# Patient Record
Sex: FEMALE | Race: BLACK OR AFRICAN AMERICAN | Employment: FULL TIME | URBAN - METROPOLITAN AREA
[De-identification: names, ages, dates, MRNs, and addresses within clinical notes are randomized per-mention and may not be internally consistent; named-entity substitution may affect disease eponyms.]

---

## 2022-10-13 ENCOUNTER — APPOINTMENT (OUTPATIENT)
Dept: GENERAL RADIOLOGY | Age: 47
End: 2022-10-13
Payer: COMMERCIAL

## 2022-10-13 ENCOUNTER — HOSPITAL ENCOUNTER (EMERGENCY)
Age: 47
Discharge: HOME | End: 2022-10-13
Attending: EMERGENCY MEDICINE
Payer: COMMERCIAL

## 2022-10-13 VITALS
SYSTOLIC BLOOD PRESSURE: 184 MMHG | HEIGHT: 64 IN | BODY MASS INDEX: 39.15 KG/M2 | TEMPERATURE: 98 F | WEIGHT: 229.3 LBS | DIASTOLIC BLOOD PRESSURE: 110 MMHG | OXYGEN SATURATION: 99 % | RESPIRATION RATE: 14 BRPM

## 2022-10-13 DIAGNOSIS — Z23 NEED FOR TETANUS BOOSTER: ICD-10-CM

## 2022-10-13 DIAGNOSIS — S61.411A LACERATION OF RIGHT HAND WITHOUT FOREIGN BODY, INITIAL ENCOUNTER: Primary | ICD-10-CM

## 2022-10-13 DIAGNOSIS — I10 ESSENTIAL HYPERTENSION: ICD-10-CM

## 2022-10-13 DIAGNOSIS — Z76.0 ENCOUNTER FOR MEDICATION REFILL: ICD-10-CM

## 2022-10-13 PROCEDURE — 73130 X-RAY EXAM OF HAND: CPT

## 2022-10-13 PROCEDURE — 90471 IMMUNIZATION ADMIN: CPT | Performed by: EMERGENCY MEDICINE

## 2022-10-13 PROCEDURE — 12002 RPR S/N/AX/GEN/TRNK2.6-7.5CM: CPT

## 2022-10-13 PROCEDURE — 99284 EMERGENCY DEPT VISIT MOD MDM: CPT

## 2022-10-13 PROCEDURE — 90714 TD VACC NO PRESV 7 YRS+ IM: CPT | Performed by: EMERGENCY MEDICINE

## 2022-10-13 PROCEDURE — 6360000002 HC RX W HCPCS: Performed by: EMERGENCY MEDICINE

## 2022-10-13 RX ORDER — HYDROCHLOROTHIAZIDE 12.5 MG/1
12.5 TABLET ORAL DAILY
COMMUNITY
End: 2022-10-13 | Stop reason: SDUPTHER

## 2022-10-13 RX ORDER — AMLODIPINE BESYLATE 10 MG/1
10 TABLET ORAL DAILY
Qty: 30 TABLET | Refills: 0 | Status: SHIPPED | OUTPATIENT
Start: 2022-10-13

## 2022-10-13 RX ORDER — HYDROCHLOROTHIAZIDE 12.5 MG/1
12.5 TABLET ORAL DAILY
Qty: 30 TABLET | Refills: 0 | Status: SHIPPED | OUTPATIENT
Start: 2022-10-13

## 2022-10-13 RX ORDER — AMLODIPINE BESYLATE 10 MG/1
10 TABLET ORAL DAILY
COMMUNITY
End: 2022-10-13 | Stop reason: SDUPTHER

## 2022-10-13 RX ORDER — CEPHALEXIN 500 MG/1
500 CAPSULE ORAL 4 TIMES DAILY
Qty: 40 CAPSULE | Refills: 0 | Status: SHIPPED | OUTPATIENT
Start: 2022-10-13 | End: 2022-10-23

## 2022-10-13 RX ADMIN — CLOSTRIDIUM TETANI TOXOID ANTIGEN (FORMALDEHYDE INACTIVATED) AND CORYNEBACTERIUM DIPHTHERIAE TOXOID ANTIGEN (FORMALDEHYDE INACTIVATED) 0.5 ML: 5; 2 INJECTION, SUSPENSION INTRAMUSCULAR at 16:20

## 2022-10-13 ASSESSMENT — PAIN DESCRIPTION - DESCRIPTORS: DESCRIPTORS: THROBBING

## 2022-10-13 ASSESSMENT — PAIN DESCRIPTION - LOCATION: LOCATION: HAND

## 2022-10-13 ASSESSMENT — PAIN - FUNCTIONAL ASSESSMENT: PAIN_FUNCTIONAL_ASSESSMENT: 0-10

## 2022-10-13 NOTE — LETTER
[facility] Emergency Department  [address], [facility], [address]             October 13, 2022    Patient: Barbara Jacob   YOB: 1975   Date of Visit: 10/13/2022       To Whom It May Concern:    Barbara Jacob was seen and treated in our emergency department on 10/13/2022. She may return to work on 10/16/2022.       Sincerely,   Mercy Canseco RN      ***

## 2022-10-13 NOTE — ED PROVIDER NOTES
Emergency Physician Note        Note Open Time: 3:38 PM EDT    Chief Complaint  Laceration (Pt presents with laceration to right hand. Reports she was washing a glass and it broke. )       History of Present Illness  Ayush Winkler is a 50 y.o. female who presents to the ED for hand laceration. Patient reports that she was washing a cup when it shattered and cut her hand. Tetanus is not up-to-date. Bleeding controlled prior to arrival.  No other injuries reported. Pain is mild to moderate and constant and nonradiating. 10 systems reviewed, pertinent positives per HPI otherwise noted to be negative    I have reviewed the following from the nursing documentation:      Prior to Admission medications    Medication Sig Start Date End Date Taking? Authorizing Provider   amLODIPine (NORVASC) 10 MG tablet Take 10 mg by mouth daily   Yes Historical Provider, MD   hydroCHLOROthiazide (HYDRODIURIL) 12.5 MG tablet Take 12.5 mg by mouth daily   Yes Historical Provider, MD       Allergies as of 10/13/2022    (No Known Allergies)       Past Medical History:   Diagnosis Date    Hypertension         Surgical History:   Past Surgical History:   Procedure Laterality Date    HYSTERECTOMY (CERVIX STATUS UNKNOWN)          Family History:  History reviewed. No pertinent family history.     Social History     Socioeconomic History    Marital status: Single     Spouse name: Not on file    Number of children: Not on file    Years of education: Not on file    Highest education level: Not on file   Occupational History    Not on file   Tobacco Use    Smoking status: Never    Smokeless tobacco: Never   Vaping Use    Vaping Use: Never used   Substance and Sexual Activity    Alcohol use: Yes     Comment: occasional    Drug use: Never    Sexual activity: Not on file   Other Topics Concern    Not on file   Social History Narrative    Not on file     Social Determinants of Health     Financial Resource Strain: Not on file   Food Insecurity: Not on file   Transportation Needs: Not on file   Physical Activity: Not on file   Stress: Not on file   Social Connections: Not on file   Intimate Partner Violence: Not on file   Housing Stability: Not on file       Nursing notes reviewed. ED Triage Vitals [10/13/22 1440]   Enc Vitals Group      BP (!) 184/110      Pulse       Resp 14      Temp 98 °F (36.7 °C)      Temp Source Oral      SpO2 99 %      Weight 229 lb 4.8 oz (104 kg)      Height 5' 4\" (1.626 m)      Head Circumference       Peak Flow       Pain Score       Pain Loc       Pain Edu? Excl. in [address]? GENERAL:  Awake, alert. Well developed, well nourished with no apparent distress. HENT:  Normocephalic, Atraumatic, moist mucous membranes. EXTREMITIES:  Normal range of motion, no edema, no bony tenderness, no deformity, distal pulses present. Neurovascular intact to the fingertips of the right hand with normal tendon function and strength as well. SKIN: Warm, dry and linear laceration oriented transversely to the axis of the extremity that extends from just barely on the palmar aspect of the ulnar edge of the hand and then to primarily extend over the dorsum of the hand overlying the fifth metacarpal.  Total length of laceration is 4 cm. NEUROLOGIC: Normal mental status. Moving all extremities to command. RADIOLOGY  X-RAYS:  I have reviewed radiologic plain film image(s). ALL OTHER NON-PLAIN FILM IMAGES SUCH AS CT, ULTRASOUND AND MRI HAVE BEEN READ BY THE RADIOLOGIST. XR HAND RIGHT (MIN 3 VIEWS)   Preliminary Result   1. No evidence of radiopaque foreign body. 2. No evidence of acute fracture. PROCEDURES  PROCEDURE:  LACERATION REPAIR  Zoya Blair or their surrogate had an opportunity to ask questions, and the risks, benefits, and alternatives were discussed. The wound was prepped and draped to maintain a sterile field. A local anesthetic was used to completely anesthetize the wound. It was copiously irrigated.  It was explored to its depth in a bloodless field with no sign of tendon, nerve, or vascular injury. No foreign bodies were identified. It was closed with 9 interrupted stitches of 5-0 Prolene. Total length of laceration(s) is 4cm. There were no complications during the procedure. MEDICAL DECISION MAKING     Given that the injury occurred in [facility] I do consider this to be a dirty wound and for this reason it was well irrigated and I am placing the patient on antibiotics for prophylaxis of infection. Patient is requesting refill of her antihypertensive medications as well. I advised the patient to return to the emergency department immediately for any new or worsening symptoms, such as any redness or swelling or drainage from the wound. .   The patient voiced agreement and understanding of the treatment plan. No results found for this visit on 10/13/22. I estimate there is LOW risk for CELLULITIS, COMPARTMENT SYNDROME, NECROTIZING FASCIITIS, TENDON OR NEUROVASCULAR INJURY, or FOREIGN BODY, thus I consider the discharge disposition reasonable. Also, there is no evidence or peritonitis, sepsis, or toxicity. Saumya Sherman and I have discussed the diagnosis and risks, and we agree with discharging home to follow-up with their primary doctor. We also discussed returning to the Emergency Department immediately if new or worsening symptoms occur. We have discussed the symptoms which are most concerning (e.g., changing or worsening pain, fever, numbness, weakness, cool or painful digits) that necessitate immediate return. Final Impression    1. Laceration of right hand without foreign body, initial encounter    2. Need for tetanus booster    3. Encounter for medication refill    4. Essential hypertension        Discharge Vital Signs:  Blood pressure (!) 184/110, temperature 98 °F (36.7 °C), temperature source Oral, resp. rate 14, height 5' 4\" (1.626 m), weight 229 lb 4.8 oz (104 kg), SpO2 99 %.       Patient was given scripts for the following medications. I counseled patient how to take these medications. New Prescriptions    CEPHALEXIN (KEFLEX) 500 MG CAPSULE    Take 1 capsule by mouth 4 times daily for 10 days       Disposition  Pt is in good condition upon Discharge to home. This chart was generated using the [employer] dictation system. I created this record but it may contain dictation errors.           Nicko Laureano MD  10/13/22 9613

## 2022-10-13 NOTE — ED TRIAGE NOTES
Pt presents with laceration to right hand. Reports she was washing a glass and it broke. Pt states last tetanus greater than 10 years.

## 2022-10-13 NOTE — LETTER
[facility] Emergency Department  [address] [facility], [address]             October 13, 2022    Patient: Eusebio Victoria   YOB: 1975   Date of Visit: 10/13/2022       To Whom It May Concern:    Eusebio Victoria was seen and treated in our emergency department on 10/13/2022. She  Lydia return to work on 10/16/2022.     Sincerely,   José Miguel Reid RN      ***

## 2022-10-21 ENCOUNTER — HOSPITAL ENCOUNTER (EMERGENCY)
Age: 47
Discharge: HOME | End: 2022-10-21
Payer: COMMERCIAL

## 2022-10-21 VITALS
RESPIRATION RATE: 18 BRPM | HEIGHT: 64 IN | WEIGHT: 225 LBS | OXYGEN SATURATION: 97 % | SYSTOLIC BLOOD PRESSURE: 149 MMHG | TEMPERATURE: 97.1 F | DIASTOLIC BLOOD PRESSURE: 104 MMHG | BODY MASS INDEX: 38.41 KG/M2 | HEART RATE: 90 BPM

## 2022-10-21 DIAGNOSIS — Z48.02 VISIT FOR SUTURE REMOVAL: Primary | ICD-10-CM

## 2022-10-21 PROCEDURE — 99282 EMERGENCY DEPT VISIT SF MDM: CPT

## 2022-10-21 ASSESSMENT — PAIN - FUNCTIONAL ASSESSMENT: PAIN_FUNCTIONAL_ASSESSMENT: NONE - DENIES PAIN

## 2022-10-21 ASSESSMENT — ENCOUNTER SYMPTOMS
CONSTIPATION: 0
NAUSEA: 0
COUGH: 0
SHORTNESS OF BREATH: 0
SORE THROAT: 0
BACK PAIN: 0
ABDOMINAL PAIN: 0
VOMITING: 0
DIARRHEA: 0
EYE PAIN: 0
RHINORRHEA: 0

## 2022-10-21 NOTE — Clinical Note
Porsha Kruger was seen and treated in our emergency department on 10/21/2022. She may return to work on 10/22/2022. If you have any questions or concerns, please don't hesitate to call.       BELINDA Rajan

## 2022-10-21 NOTE — DISCHARGE INSTRUCTIONS
Follow up with your primary care provider in one week for a recheck    Keep steri-strips over wound until they fall off    Wear finger splint to prevent wound from opening back up for about one week while active at work    Return to the ED if you develop worsening pain, wound discharge, fever or chills. Take Tylenol for pain as needed.

## 2022-10-21 NOTE — ED PROVIDER NOTES
[address] [facility]        Pt Name: Enedelia Velasquez  MRN: [id number]  [place] 1975  Date of evaluation: 10/21/2022  Provider: BELINDA Richter  PCP: No primary care provider on file. Note Started: 5:53 PM EDT       TERESA. I have evaluated this patient. My supervising physician was available for consultation. CHIEF COMPLAINT       Chief Complaint   Patient presents with    Suture / Staple Removal     Stitches removed from right hand       HISTORY OF PRESENT ILLNESS   (Location, Timing/Onset, Context/Setting, Quality, Duration, Modifying Factors, Severity, Associated Signs and Symptoms)  Note limiting factors. Chief Complaint: Suture removal right hand    Enedelia Velasquez is a 50 y.o. female who presents the emergency department to have sutures removed from the right hand. Patient states that sutures were placed approximately 8 days ago. Patient has not noticed any fevers, chills, wound discharge. Patient has some mild tenderness over the suture site but otherwise in no acute pain. Patient has full range of motion of the fingers on the right hand. There is a sutured laceration to the dorsal aspect fifth digit side of the right hand. There are 9 sutures in place. Nursing Notes were all reviewed and agreed with or any disagreements were addressed in the HPI. REVIEW OF SYSTEMS    (2-9 systems for level 4, 10 or more for level 5)     Review of Systems   Constitutional:  Negative for chills, diaphoresis and fever. HENT:  Negative for congestion, rhinorrhea and sore throat. Eyes:  Negative for pain and visual disturbance. Respiratory:  Negative for cough and shortness of breath. Cardiovascular:  Negative for chest pain and leg swelling. Gastrointestinal:  Negative for abdominal pain, constipation, diarrhea, nausea and vomiting. Genitourinary:  Negative for difficulty urinating, dysuria and frequency.    Musculoskeletal: Negative for back pain and neck pain. Skin:  Negative for rash and wound. Neurological:  Negative for dizziness and light-headedness. Positives and Pertinent negatives as per HPI. Except as noted above in the ROS, all other systems were reviewed and negative. PAST MEDICAL HISTORY     Past Medical History:   Diagnosis Date    Hypertension          SURGICAL HISTORY     Past Surgical History:   Procedure Laterality Date    HYSTERECTOMY (CERVIX STATUS UNKNOWN)           CURRENTMEDICATIONS       Previous Medications    AMLODIPINE (NORVASC) 10 MG TABLET    Take 1 tablet by mouth daily    CEPHALEXIN (KEFLEX) 500 MG CAPSULE    Take 1 capsule by mouth 4 times daily for 10 days    HYDROCHLOROTHIAZIDE (HYDRODIURIL) 12.5 MG TABLET    Take 1 tablet by mouth daily         ALLERGIES     Patient has no known allergies. FAMILYHISTORY     No family history on file. SOCIAL HISTORY       Social History     Tobacco Use    Smoking status: Never    Smokeless tobacco: Never   Vaping Use    Vaping Use: Never used   Substance Use Topics    Alcohol use: Yes     Comment: occasional    Drug use: Never       SCREENINGS    Asha Coma Scale  Eye Opening: Spontaneous  Best Verbal Response: Oriented  Best Motor Response: Obeys commands  [place] Coma Scale Score: 15        PHYSICAL EXAM    (up to 7 for level 4, 8 or more for level 5)     ED Triage Vitals [10/21/22 1714]   BP Temp Temp Source Heart Rate Resp SpO2 Height Weight   (!) 149/104 97.1 °F (36.2 °C) Temporal 90 18 97 % 5' 4\" (1.626 m) 225 lb (102.1 kg)       Physical Exam  Vitals and nursing note reviewed. Constitutional:       Appearance: Normal appearance. Cardiovascular:      Comments: Right radial pulse 2+. Musculoskeletal:      Right hand: Laceration present. No swelling, deformity, tenderness or bony tenderness. Normal range of motion. Normal strength. Normal sensation. Normal pulse.       Left hand: Normal.   Skin:     Comments: Right hand dorsal aspect with 9 sutures in place over the fifth metacarpal area. There is no signs of infection, redness, discharge. Of the fifth digit. Neurological:      Mental Status: She is alert. DIAGNOSTIC RESULTS   LABS:    Labs Reviewed - No data to display    When ordered only abnormal lab results are displayed. All other labs were within normal range or not returned as of this dictation. EKG: When ordered, EKG's are interpreted by the Emergency Department Physician in the absence of a cardiologist.  Please see their note for interpretation of EKG. RADIOLOGY:   Non-plain film images such as CT, Ultrasound and MRI are read by the radiologist. Plain radiographic images are visualized and preliminarily interpreted by the ED Provider with the below findings:        Interpretation per the Radiologist below, if available at the time of this note:    No orders to display     No results found. PROCEDURES   Unless otherwise noted below, none     Suture Removal    Date/Time: 10/21/2022 5:56 PM  Performed by: Adelene Riedel, PA  Authorized by: Adelene Riedel, PA     Consent:     Consent obtained:  Verbal    Consent given by:  Patient  Procedure details:     Wound appearance:  No signs of infection and clean    Number of sutures removed:  9  Post-procedure details:     Post-removal:  Steri-Strips applied    Procedure completion:  Tolerated  Comments:      Due to higher risk for dehiscence Steri-Strips were placed over the wound as well as a finger splint applied to the fifth digit to prevent further flexion and extension of this finger that could cause wound dehiscence.     CRITICAL CARE TIME       CONSULTS:  None      EMERGENCY DEPARTMENT COURSE and DIFFERENTIAL DIAGNOSIS/MDM:   Vitals:    Vitals:    10/21/22 1714   BP: (!) 149/104   Pulse: 90   Resp: 18   Temp: 97.1 °F (36.2 °C)   TempSrc: Temporal   SpO2: 97%   Weight: 225 lb (102.1 kg)   Height: 5' 4\" (1.626 m)       Patient was given the following medications:  Medications - No data to display      Is this patient to be included in the SEP-1 Core Measure due to severe sepsis or septic shock? No   Exclusion criteria - the patient is NOT to be included for SEP-1 Core Measure due to: Infection is not suspected    47-year-old female presents emergency department to have sutures removed. Patient states that the sutures were placed approximately 8 days ago. Patient has not noticed any redness swelling or discharge. On exam patient otherwise appears well has full range of motion of the hand and fingers. There is no signs of infection. 9 sutures are in place and these were removed today. There was concern for possible dehiscence given that there is a a lot of movement in this area and Steri-Strips were applied as well as a finger splint to wear during the day when active at work to prevent her from fully flexing extending the finger to prevent wound dehiscence. Patient will follow up with her primary care doctor and take Tylenol or ibuprofen for pain as needed. All questions were answered at time of discharge. Patient tolerated procedure well without any difficulty. FINAL IMPRESSION      1. Visit for suture removal          DISPOSITION/PLAN   DISPOSITION Decision To Discharge 10/21/2022 05:48:02 PM      PATIENT REFERRED TO:  No follow-up provider specified.     DISCHARGE MEDICATIONS:  New Prescriptions    No medications on file       DISCONTINUED MEDICATIONS:  Discontinued Medications    No medications on file              (Please note that portions of this note were completed with a voice recognition program.  Efforts were made to edit the dictations but occasionally words are mis-transcribed.)    BELINDA Hernandez (electronically signed)            BELINDA Hernandez  10/21/22 [phone] [id number]